# Patient Record
Sex: MALE
[De-identification: names, ages, dates, MRNs, and addresses within clinical notes are randomized per-mention and may not be internally consistent; named-entity substitution may affect disease eponyms.]

---

## 2019-12-12 ENCOUNTER — HOSPITAL ENCOUNTER (OUTPATIENT)
Dept: HOSPITAL 92 - LABBT | Age: 69
Discharge: HOME | End: 2019-12-12
Attending: ORTHOPAEDIC SURGERY
Payer: COMMERCIAL

## 2019-12-12 ENCOUNTER — HOSPITAL ENCOUNTER (INPATIENT)
Dept: HOSPITAL 92 - SJJU | Age: 69
LOS: 6 days | Discharge: HOME | DRG: 470 | End: 2019-12-18
Attending: ORTHOPAEDIC SURGERY | Admitting: ORTHOPAEDIC SURGERY
Payer: COMMERCIAL

## 2019-12-12 VITALS — BODY MASS INDEX: 28.1 KG/M2

## 2019-12-12 DIAGNOSIS — Z79.4: ICD-10-CM

## 2019-12-12 DIAGNOSIS — E78.5: ICD-10-CM

## 2019-12-12 DIAGNOSIS — Z96.652: ICD-10-CM

## 2019-12-12 DIAGNOSIS — Z01.818: Primary | ICD-10-CM

## 2019-12-12 DIAGNOSIS — N40.0: ICD-10-CM

## 2019-12-12 DIAGNOSIS — Z79.899: ICD-10-CM

## 2019-12-12 DIAGNOSIS — M17.11: ICD-10-CM

## 2019-12-12 DIAGNOSIS — E87.1: ICD-10-CM

## 2019-12-12 DIAGNOSIS — E11.9: ICD-10-CM

## 2019-12-12 DIAGNOSIS — M17.11: Primary | ICD-10-CM

## 2019-12-12 DIAGNOSIS — Z88.0: ICD-10-CM

## 2019-12-12 DIAGNOSIS — I10: ICD-10-CM

## 2019-12-12 LAB
ANION GAP SERPL CALC-SCNC: 14 MMOL/L (ref 10–20)
BASOPHILS # BLD AUTO: 0 THOU/UL (ref 0–0.2)
BASOPHILS NFR BLD AUTO: 0.6 % (ref 0–1)
BUN SERPL-MCNC: 23 MG/DL (ref 8.4–25.7)
CALCIUM SERPL-MCNC: 9.6 MG/DL (ref 7.8–10.44)
CHLORIDE SERPL-SCNC: 106 MMOL/L (ref 98–107)
CO2 SERPL-SCNC: 19 MMOL/L (ref 23–31)
CREAT CL PREDICTED SERPL C-G-VRATE: 0 ML/MIN (ref 70–130)
EOSINOPHIL # BLD AUTO: 0.2 THOU/UL (ref 0–0.7)
EOSINOPHIL NFR BLD AUTO: 2.5 % (ref 0–10)
GLUCOSE SERPL-MCNC: 171 MG/DL (ref 80–115)
HGB BLD-MCNC: 14.3 G/DL (ref 14–18)
INR PPP: 1.2
LYMPHOCYTES # BLD: 3.2 THOU/UL (ref 1.2–3.4)
LYMPHOCYTES NFR BLD AUTO: 39.7 % (ref 21–51)
MCH RBC QN AUTO: 31.1 PG (ref 27–31)
MCV RBC AUTO: 90.1 FL (ref 78–98)
MONOCYTES # BLD AUTO: 0.8 THOU/UL (ref 0.11–0.59)
MONOCYTES NFR BLD AUTO: 10.2 % (ref 0–10)
NEUTROPHILS # BLD AUTO: 3.7 THOU/UL (ref 1.4–6.5)
NEUTROPHILS NFR BLD AUTO: 47 % (ref 42–75)
PLATELET # BLD AUTO: 187 THOU/UL (ref 130–400)
POTASSIUM SERPL-SCNC: 4.2 MMOL/L (ref 3.5–5.1)
PROT UR STRIP.AUTO-MCNC: 20 MG/DL
PROTHROMBIN TIME: 15 SEC (ref 12–14.7)
RBC # BLD AUTO: 4.61 MILL/UL (ref 4.7–6.1)
RBC UR QL AUTO: (no result) HPF (ref 0–3)
SODIUM SERPL-SCNC: 135 MMOL/L (ref 136–145)
WBC # BLD AUTO: 7.9 THOU/UL (ref 4.8–10.8)
WBC UR QL AUTO: (no result) HPF (ref 0–3)

## 2019-12-12 PROCEDURE — 90662 IIV NO PRSV INCREASED AG IM: CPT

## 2019-12-12 PROCEDURE — S0020 INJECTION, BUPIVICAINE HYDRO: HCPCS

## 2019-12-12 PROCEDURE — 90471 IMMUNIZATION ADMIN: CPT

## 2019-12-12 PROCEDURE — C1776 JOINT DEVICE (IMPLANTABLE): HCPCS

## 2019-12-12 PROCEDURE — 87081 CULTURE SCREEN ONLY: CPT

## 2019-12-12 PROCEDURE — 85027 COMPLETE CBC AUTOMATED: CPT

## 2019-12-12 PROCEDURE — 36415 COLL VENOUS BLD VENIPUNCTURE: CPT

## 2019-12-12 PROCEDURE — 85610 PROTHROMBIN TIME: CPT

## 2019-12-12 PROCEDURE — 36416 COLLJ CAPILLARY BLOOD SPEC: CPT

## 2019-12-12 PROCEDURE — 80048 BASIC METABOLIC PNL TOTAL CA: CPT

## 2019-12-12 PROCEDURE — G0008 ADMIN INFLUENZA VIRUS VAC: HCPCS

## 2019-12-12 PROCEDURE — C1713 ANCHOR/SCREW BN/BN,TIS/BN: HCPCS

## 2019-12-12 PROCEDURE — 93005 ELECTROCARDIOGRAM TRACING: CPT

## 2019-12-12 PROCEDURE — 81001 URINALYSIS AUTO W/SCOPE: CPT

## 2019-12-12 PROCEDURE — 93010 ELECTROCARDIOGRAM REPORT: CPT

## 2019-12-12 PROCEDURE — 85025 COMPLETE CBC W/AUTO DIFF WBC: CPT

## 2019-12-12 PROCEDURE — 83036 HEMOGLOBIN GLYCOSYLATED A1C: CPT

## 2019-12-13 NOTE — HP
HISTORY OF PRESENT ILLNESS:  The patient is a 69-year-old male with a long history

of progressive degenerative arthritis of both knees, unresponsive to conservative

treatment including rest, restriction of activities, anti-inflammatory medication,

and previous cortisone injections.  He underwent left total knee replacement in May

of this year with good results, but continues to have progressive problems with

right knee, which is interfering with day-to-day activities including walking,

getting dressed, and sleeping. 



PAST HISTORY:  Please see the old chart.  The patient has history of hypertension,

hyperlipidemia, prostate enlargement, and diabetes. 



CURRENT MEDICATIONS:  Include; 

1. Naproxen.

2. MiraLAX.

3. Cymbalta.

4. Glipizide.

5. Lipitor.

6. Metformin.

7. Actos.

8. Avodart.

9. Flomax.

10. Hydrocodone.



ALLERGIES:  HE IS ALLERGIC TO PENICILLIN.



FAMILY HISTORY:  Otherwise, unremarkable.



SOCIAL HISTORY:  Otherwise, unremarkable.



REVIEW OF SYSTEMS:  Otherwise, unremarkable.



PHYSICAL EXAMINATION:

GENERAL:  Reveals a healthy male. 

HEENT:  Unremarkable. 

NECK:  Supple. 

CHEST:  Clear. 

HEART:  Regular rate and rhythm. 

ABDOMEN:  Soft, nontender. 

RECTAL:  Deferred. 

GENITAL:  Deferred. 

EXTREMITIES:  Pertinent findings related to the right knee, there is puffiness, but

no effusion.  There is mild varus.  There is tenderness and crepitus over the medial

joint line.  Range of motion is 5 to 100 degrees.  There is a right antalgic gait.

Pulses are 1+.  Neurovascular exam is intact.  Good capillary refill. 



DIAGNOSTIC STUDIES:  X-rays of the right knee reveal bone-on-bone collapse medially.



IMPRESSION:  

1. Degenerative arthritis, right knee.

2. Status post left total knee replacement.

3. History of hypertension.

4. History of diabetes.

5. History of benign prostatic hyperplasia.



PLAN:  Right total knee replacement.  The nature of the surgery, length of recovery,

and potential complications such as infection, loss of motion, incomplete relief,

delayed wound healing, neurovascular injury, thromboembolic phenomena, and need for

revision have been discussed in detail. 







Job ID:  [id number]

## 2019-12-16 PROCEDURE — 8E0YXBZ COMPUTER ASSISTED PROCEDURE OF LOWER EXTREMITY: ICD-10-PCS | Performed by: ORTHOPAEDIC SURGERY

## 2019-12-16 PROCEDURE — 0SRC0J9 REPLACEMENT OF RIGHT KNEE JOINT WITH SYNTHETIC SUBSTITUTE, CEMENTED, OPEN APPROACH: ICD-10-PCS | Performed by: ORTHOPAEDIC SURGERY

## 2019-12-16 RX ADMIN — HYDROCODONE BITARTRATE AND ACETAMINOPHEN PRN TAB: 10; 325 TABLET ORAL at 18:24

## 2019-12-16 RX ADMIN — INSULIN LISPRO PRN UNIT: 100 INJECTION, SOLUTION INTRAVENOUS; SUBCUTANEOUS at 18:09

## 2019-12-16 RX ADMIN — ASPIRIN SCH MG: 81 TABLET ORAL at 21:14

## 2019-12-16 NOTE — OP
DATE OF PROCEDURE:  12/16/2019



ASSISTANT:  Cecile Greene PA-C



ANESTHESIA:  General plus adductor canal and sciatic nerve blocks.



PREOPERATIVE DIAGNOSIS:  Degenerative arthritis, right knee.



POSTOPERATIVE DIAGNOSIS:  Degenerative arthritis, right knee.



PROCEDURE PERFORMED:  Right total knee replacement with computer-assisted navigation

with cemented [place] Triathlon components (#5 femoral component, #6 primary tibial

baseplate with 9 mm CS plastic insert, and A35 all-plastic patellar component). 



DESCRIPTION OF PROCEDURE:  After satisfactory anesthesia was induced in supine

position, sequential compression devices were placed on the nonoperative leg

throughout the procedure.  The right leg was then prepped and draped in routine

sterile fashion.  The right leg was elevated and exsanguinated with an Esmarch

bandage and the tourniquet was inflated to 300 mmHg.  A gently curved medial

parapatellar incision was made carried down through the subcutaneous tissues and

bleeding points were controlled with Bovie cautery.  Medial parapatellar arthrotomy

was performed.  Patella was kept laterally and portion of the fat pad were excised

for exposure.  There was marked degenerative arthritis of the knee, especially

medially with large areas of exposed bone.  Meniscal remnants and osteophytes were

removed.  Using the Proficient pinless navigation system and the appropriate guides,

the distal femoral and proximal tibial articular surfaces were excised with an

oscillating saw to accept the trial components.  It was felt that #5 femoral

component and #6 primary tibial baseplate with 9 mm CS plastic insert gave

appropriate size, fit, and stability.  The patellar articular surface was excised to

accept an all-plastic A35 patellar component.  There was good range of motion and

good patellar tracking.  The trial components were removed.  The knee was copiously

irrigated with pulsatile lavage.  The bony surfaces were thoroughly cleaned and

dried.  The permanent components were then cemented in a single stage using one

package of cement, premixed with 1 g of tobramycin powder.  Excess cement was

removed.  There was again good fit and stability of the components.  The knee was

again copiously irrigated.  The medial retinaculum and quadriceps mechanism was

closed with interrupted #2 Vicryl, running #2 Quill.  Subcutaneous tissues were

infiltrated with 30 mL of 0.25% Marcaine with epinephrine.  Subcutaneous tissues

were closed with a running 0 Quill suture.  The skin closed with running

subcuticular 3-0 Monoderm and SurgiSeal skin adhesive.  A sterile bulky compressive

dressing was applied and the tourniquet deflated after 69 minutes.  The foot

promptly pinked up.  Sequential compression devices were applied to his operated

leg.  He was awakened, taken to the recovery room in stable condition.  There were

no apparent intraoperative complications.  The estimated blood loss was less than

100 mL. 







Job ID:  [id number]

## 2019-12-16 NOTE — CON
DATE OF CONSULTATION:  



PRIMARY CARE PROVIDER:  None.



CHIEF COMPLAINT:  Management of medical comorbidities.



HISTORY OF PRESENT ILLNESS:  Mr. Boles is a pleasant 69-year-old gentleman, who

was seen at [facility] on December 16, 2019. 



He had right knee surgery earlier today.  Hospitalist Service was consulted for

management of medical comorbidities. 



Mr. Boles reports that he does not have any medical problems.  On further

questioning, he reports that he was treated in the past for diabetes mellitus,

dyslipidemia, and prostate enlargement.  His physician reportedly moved from the

area and he stopped taking all medications about a year ago. 



The patient denies any chest pain, shortness of breath, fevers, or chills.  He

denies any nausea or vomiting.  He denies any abdominal pain. 



REVIEW OF SYSTEMS:  All systems were reviewed and found to be negative.



PAST MEDICAL HISTORY:  As discussed above.



PAST SURGICAL HISTORY:  Right knee surgery today.



FAMILY HISTORY:  No family history of premature coronary artery disease.



SOCIAL HISTORY:  The patient denies tobacco use, alcohol use, or recreational drug

use. 



ALLERGIES:  PENICILLIN.



HOME MEDICATIONS:  

1. Ibuprofen p.r.n.

2. Tylenol p.r.n.



PHYSICAL EXAMINATION:

GENERAL:  On examination, Mr. Boles is awake and alert, not in acute distress. 

VITAL SIGNS:  Blood pressure is 131/68, pulse 43, respiratory rate 18, and oxygen

saturation 97% on room air.  He is afebrile. 

EYES:  No scleral icterus.  No conjunctival pallor. 

ENT:  Moist mucosal membranes.  No oropharyngeal erythema or exudates. 

NECK:  Supple, nontender.  Trachea is midline. 

RESPIRATORY:  Accessory muscles of breathing are not active.  Chest wall movements

are symmetric bilaterally.  Lungs are clear to auscultation without wheeze, rhonchi,

or crepitations. 

CARDIOVASCULAR:  S1 and S2 are heard, regular.  Peripheral pulses palpable. 

ABDOMEN:  Soft, nontender.  Bowel sounds are heard. 

NEUROLOGIC:  Cranial nerves II through XII are intact. 

MUSCULOSKELETAL:  Status post right knee surgery. 

SKIN:  No rashes. 

LYMPHATIC:  No cervical lymphadenopathy. 

PSYCHIATRIC:  Normal mood.  Normal affect.  The patient is oriented to person,

place, and time. 



DIAGNSOTIC DATA:  Mr. Boles' labs and investigations were reviewed.  Point-of-care

glucose today is 189.  On December 12th, he had normal white count, normal

hemoglobin, normal platelet count.  INR 1.2.  Mildly decreased sodium of 135, normal

potassium of 4.2, and creatinine 0.99.  Urinalysis was negative for nitrite and

leukocyte esterase. 



ASSESSMENT AND PLAN:  Mr. Boles is a pleasant 69-year-old gentleman, who was seen

at [facility] on December 16, 2019.  His problem list

includes: 

1. Diabetes mellitus:  Mr. Boles reports that he was treated in the past for

diabetes mellitus, but has stopped taking his medications approximately a year ago.

I will start him on Accu-Cheks with insulin sliding scale.  I will also check his

hemoglobin A1c.  Further management depending on the outcome of the test. 

2. Dyslipidemia:  The patient is currently not on any medications.  He will be

advised to seek primary care provider and follow up with them. 

3. Benign prostate hypertrophy:  The patient appears to be asymptomatic at this

time.  He will need to establish care with primary care provider after discharge. 

4. Hyponatremia:  Mild, likely asymptomatic. 



Many thanks for allowing me to participate in Mr. Boles' care.  Please feel free

to contact me with any questions or concerns. 



LEVEL OF RISK:  Moderate.



LEVEL OF COMPLEXITY:  Moderate.







Job ID:  [id number]

## 2019-12-16 NOTE — RAD
XR Knee Rt 2 View:



 12/16/2019 8:56 AM



CLINICAL INDICATION: Postoperative right knee



COMPARISON: None.



FINDINGS:



Bones:  No acute fracture is demonstrated. 



Joints: There is been interval placement of the right knee. Prosthesis projects in expected position.
 There is scattered intra-articular and periarticular soft tissue gas consistent with the patient's

recent postoperative state.. 



Soft Tissue: No acute abnormality..

  

IMPRESSION: 

Right total knee replacement. 



Reported By: Jatinder Huffman 

Electronically Signed:  12/16/2019 9:13 AM

## 2019-12-17 LAB
HGB BLD-MCNC: 11 G/DL (ref 14–18)
MCH RBC QN AUTO: 30.9 PG (ref 27–31)
MCV RBC AUTO: 89.3 FL (ref 78–98)
PLATELET # BLD AUTO: 142 THOU/UL (ref 130–400)
RBC # BLD AUTO: 3.57 MILL/UL (ref 4.7–6.1)
WBC # BLD AUTO: 8.2 THOU/UL (ref 4.8–10.8)

## 2019-12-17 RX ADMIN — ASPIRIN SCH MG: 81 TABLET ORAL at 07:53

## 2019-12-17 RX ADMIN — ASPIRIN SCH MG: 81 TABLET ORAL at 21:31

## 2019-12-17 RX ADMIN — HYDROCODONE BITARTRATE AND ACETAMINOPHEN PRN TAB: 10; 325 TABLET ORAL at 09:04

## 2019-12-17 RX ADMIN — Medication PRN ML: at 11:45

## 2019-12-17 RX ADMIN — HYDROCODONE BITARTRATE AND ACETAMINOPHEN PRN TAB: 10; 325 TABLET ORAL at 21:38

## 2019-12-17 RX ADMIN — HYDROCODONE BITARTRATE AND ACETAMINOPHEN PRN TAB: 10; 325 TABLET ORAL at 04:03

## 2019-12-17 RX ADMIN — DOCUSATE SODIUM 50 MG AND SENNOSIDES 8.6 MG SCH TAB: 8.6; 5 TABLET, FILM COATED ORAL at 07:53

## 2019-12-17 RX ADMIN — INSULIN LISPRO PRN UNIT: 100 INJECTION, SOLUTION INTRAVENOUS; SUBCUTANEOUS at 21:31

## 2019-12-17 RX ADMIN — INSULIN LISPRO PRN UNIT: 100 INJECTION, SOLUTION INTRAVENOUS; SUBCUTANEOUS at 11:44

## 2019-12-17 RX ADMIN — DOCUSATE SODIUM 50 MG AND SENNOSIDES 8.6 MG SCH TAB: 8.6; 5 TABLET, FILM COATED ORAL at 21:31

## 2019-12-17 RX ADMIN — MULTIPLE VITAMINS W/ MINERALS TAB SCH TAB: TAB at 07:53

## 2019-12-17 RX ADMIN — HYDROCODONE BITARTRATE AND ACETAMINOPHEN PRN TAB: 10; 325 TABLET ORAL at 16:05

## 2019-12-17 RX ADMIN — Medication PRN ML: at 18:11

## 2019-12-17 NOTE — PDOC.HOSPP
- Subjective


Encounter Date: 12/17/19


Encounter Time: 09:00


Subjective: 





Pt seen for followup re: DM2.  Feels well, no complaints.





- Objective


Vital Signs & Weight: 


 Vital Signs (12 hours)











  Temp Pulse Resp BP Pulse Ox


 


 12/17/19 15:35  98.3 F  60  18  147/80 H  94 L


 


 12/17/19 11:04  97.1 F L  61  20  117/68  94 L


 


 12/17/19 08:00      94 L


 


 12/17/19 07:48  98.1 F  78  16  127/76  94 L








 Weight











Admit Weight                   180 lb


 


Weight                         180 lb














I&O: 


 











 12/16/19 12/17/19 12/18/19





 06:59 06:59 06:59


 


Intake Total  3005 


 


Output Total  1475 


 


Balance  1530 











Result Diagrams: 


 12/17/19 04:49





Additional Labs: 


 Accuchecks











  12/17/19 12/17/19 12/17/19





  16:00 10:48 05:16


 


POC Glucose  140 H  217 H  149 H














  12/16/19 12/16/19





  20:40 18:04


 


POC Glucose  178 H  283 H








Labs and MARs reviewed by me





Hospitalist ROS





- Review of Systems


Cardiovascular: denies: chest pain, palpitations, orthopnea, paroxysmal noc. 

dyspnea, edema, light headedness


Gastrointestinal: denies: nausea, vomiting, abdominal pain, diarrhea, 

constipation, melena, hematochezia





- Medication


Medications: 


Active Medications











Generic Name Dose Route Start Last Admin





  Trade Name Freq  PRN Reason Stop Dose Admin


 


Hydrocodone Bitart/Acetaminophen  1 tab  12/16/19 07:13  12/17/19 04:03





  [place] 10/325  PO   1 tab





  Q4H PRN   Administration





  Pain (1-3)   





     





     





     


 


Hydrocodone Bitart/Acetaminophen  2 tab  12/16/19 07:13  12/17/19 16:05





  [place] 10/325  PO   2 tab





  Q4H PRN   Administration





  PAIN (4-6)   





     





     





     


 


Aspirin  81 mg  12/16/19 21:00  12/17/19 07:53





  Ecotrin  PO   81 mg





  BID KAY   Administration





     





     





     





     


 


Ropivacaine 250 ml/ Device  250 mls @ 10 mls/hr  12/16/19 07:13  12/17/19 12:07





  NERVE BLCK  12/19/19 07:12  250 mls





  INF KAY   Administration





     





     





     





     


 


Sodium Chloride  1,000 mls @ 100 mls/hr  12/16/19 10:36  12/17/19 05:53





  Normal Saline 0.9%  IV   Not Given





  .Q10H KAY   





     





     





     





     


 


Insulin Human Lispro  0 units  12/16/19 17:53  12/17/19 11:44





  Humalog  SC   3 unit





  .MILD SLIDING SCALE PRN   Administration





  Mild Correctional Scale   





     





     





     


 


Iron/Minerals/Multivitamins  1 tab  12/17/19 09:00  12/17/19 07:53





  Theragran M  PO   1 tab





  DAILY KAY   Administration





     





     





     





     


 


Ketorolac Tromethamine  15 mg  12/16/19 12:00  12/17/19 11:42





  Toradol  IVP  12/18/19 06:01  15 mg





  Q6HR KAY   Administration





     





     





     





     


 


Ketorolac Tromethamine  15 mg  12/16/19 12:00  12/17/19 11:43





  Toradol  IVP  12/18/19 12:01  Not Given





  Q6HR KAY   





     





     





     





     


 


Senna/Docusate Sodium  2 tab  12/17/19 09:00  12/17/19 07:53





  Senokot S  PO   2 tab





  BID KAY   Administration





     





     





     





     


 


Sodium Chloride  10 ml  12/16/19 10:36  12/17/19 11:45





  Flush - Normal Saline  IVF   10 ml





  PRN PRN   Administration





  Saline Flush   





     





     





     


 


Tramadol HCl  50 mg  12/16/19 07:13  12/16/19 21:18





  Ultram  PO   50 mg





  Q6H PRN   Administration





  Mild Pain (1-3)   





     





     





     


 


Tramadol HCl  100 mg  12/16/19 07:13  12/17/19 06:21





  Ultram  PO   100 mg





  Q6H PRN   Administration





  Moderate Pain 4-6   





     





     





     














- Exam


General Appearance: NAD


Eye: anicteric sclera


ENT: moist mucosa


Neck: supple


Heart: murmur present


Respiratory: CTAB


Gastrointestinal: soft, non-tender


Skin: no lesions


Neurological: no weakness


Psychiatric: normal affect, normal behavior





Hosp A/P


(1) DM2 (diabetes mellitus, type 2)


Status: Chronic   





(2) HTN (hypertension)


Code(s): I10 - ESSENTIAL (PRIMARY) HYPERTENSION   Status: Chronic   





- Plan





Continue accuchecks, insulin sliding scale.


Monitor vital signs, titrate antihypertensives as needed.


Pt educated on the importance of medication compliance.

## 2019-12-17 NOTE — PRG
DATE OF SERVICE:  12/17/2019



SUBJECTIVE:  Julian is a 69-year-old male postop day 1 from right total knee

arthroplasty.  He is doing relatively well.  He had a little bit of discomfort last

night, but otherwise he ambulated 120 feet yesterday. 



OBJECTIVE:  VITAL SIGNS:  Temperature 98.9, pulse 68, respiratory rate 16 and

nonlabored, blood pressure 124/71. 

GENERAL:  He is alert and oriented to person, place, time, situation, responsive,

appropriate with examiner.  EXTREMITIES:  His incision is clean.  No strike through.

 He is neurovascularly intact in the right lower extremity. 



LABORATORY DATA:  Hemoglobin 11, hematocrit 31.9.



IMPRESSION:  A 69-year-old male postop day 1 right total knee arthroplasty, doing

well. 



PLAN:  Initiate physical therapy and probable discharge to home tomorrow.







Job ID:  [id number]

## 2019-12-18 VITALS — TEMPERATURE: 97.1 F | SYSTOLIC BLOOD PRESSURE: 131 MMHG | DIASTOLIC BLOOD PRESSURE: 70 MMHG

## 2019-12-18 RX ADMIN — MULTIPLE VITAMINS W/ MINERALS TAB SCH TAB: TAB at 08:08

## 2019-12-18 RX ADMIN — DOCUSATE SODIUM 50 MG AND SENNOSIDES 8.6 MG SCH TAB: 8.6; 5 TABLET, FILM COATED ORAL at 08:08

## 2019-12-18 RX ADMIN — ASPIRIN SCH MG: 81 TABLET ORAL at 08:08

## 2019-12-18 RX ADMIN — INSULIN LISPRO PRN UNIT: 100 INJECTION, SOLUTION INTRAVENOUS; SUBCUTANEOUS at 06:17

## 2019-12-18 RX ADMIN — HYDROCODONE BITARTRATE AND ACETAMINOPHEN PRN TAB: 10; 325 TABLET ORAL at 06:16

## 2019-12-18 RX ADMIN — HYDROCODONE BITARTRATE AND ACETAMINOPHEN PRN TAB: 10; 325 TABLET ORAL at 09:52

## 2019-12-18 NOTE — DIS
DATE OF ADMISSION:  12/16/2019



DATE OF DISCHARGE:  12/18/2019



This is Cecile Greene PA-C dictating a report for Vasiliy Chi MD.



CONSULTANTS:  Include [facility] and [facility]



PREOPERATIVE DIAGNOSIS:  Degenerative arthritis, right knee.



POSTOPERATIVE DIAGNOSIS:  Degenerative arthritis, right knee.



PROCEDURES PERFORMED:  Right total knee replacement with computer-assisted

navigation system with cemented [place] Triathlon components. 



BRIEF HOSPITAL COURSE:  This is a 69-year-old male, who failed outpatient

conservative management of right knee osteoarthritis.  He was indicated for the

above-mentioned procedure.  He did well in the operative suite and was then admitted

postoperatively to [address], where he worked with

physical and occupational therapist.  His pain was managed by [facility].  He worked well with Physical and Occupational therapy, and on

postoperative day #2, he was in good and stable condition, was discharged home with

family.  No complications incurred during this hospital stay. 



DISCHARGE DISPOSITION:  Home.



DISCHARGE CONDITION:  Stable.



DISCHARGE INSTRUCTIONS:  The patient will follow up with Dr. Chi as scheduled.  He

will also follow up with Physical Therapy as scheduled.  He will keep his surgical

site clean, dry, and intact until his followup. 







Job ID:  [id number]

## 2019-12-20 ENCOUNTER — HOSPITAL ENCOUNTER (EMERGENCY)
Dept: HOSPITAL 92 - ERS | Age: 69
Discharge: HOME | End: 2019-12-20
Payer: COMMERCIAL

## 2019-12-20 DIAGNOSIS — M25.561: Primary | ICD-10-CM

## 2019-12-20 DIAGNOSIS — G89.18: ICD-10-CM

## 2019-12-20 LAB
ALBUMIN SERPL BCG-MCNC: 3.6 G/DL (ref 3.4–4.8)
ALP SERPL-CCNC: 231 U/L (ref 40–110)
ALT SERPL W P-5'-P-CCNC: 109 U/L (ref 8–55)
ANION GAP SERPL CALC-SCNC: 11 MMOL/L (ref 10–20)
AST SERPL-CCNC: 96 U/L (ref 5–34)
BASOPHILS # BLD AUTO: 0 THOU/UL (ref 0–0.2)
BASOPHILS NFR BLD AUTO: 0.4 % (ref 0–1)
BILIRUB SERPL-MCNC: 0.8 MG/DL (ref 0.2–1.2)
BUN SERPL-MCNC: 19 MG/DL (ref 8.4–25.7)
CALCIUM SERPL-MCNC: 8.7 MG/DL (ref 7.8–10.44)
CHLORIDE SERPL-SCNC: 99 MMOL/L (ref 98–107)
CO2 SERPL-SCNC: 25 MMOL/L (ref 23–31)
CREAT CL PREDICTED SERPL C-G-VRATE: 0 ML/MIN (ref 70–130)
EOSINOPHIL # BLD AUTO: 0.2 THOU/UL (ref 0–0.7)
EOSINOPHIL NFR BLD AUTO: 2.7 % (ref 0–10)
GLOBULIN SER CALC-MCNC: 3.2 G/DL (ref 2.4–3.5)
GLUCOSE SERPL-MCNC: 215 MG/DL (ref 80–115)
HGB BLD-MCNC: 11.2 G/DL (ref 14–18)
LYMPHOCYTES # BLD: 1.9 THOU/UL (ref 1.2–3.4)
LYMPHOCYTES NFR BLD AUTO: 26.2 % (ref 21–51)
MCH RBC QN AUTO: 30.6 PG (ref 27–31)
MCV RBC AUTO: 90.1 FL (ref 78–98)
MONOCYTES # BLD AUTO: 0.9 THOU/UL (ref 0.11–0.59)
MONOCYTES NFR BLD AUTO: 11.9 % (ref 0–10)
NEUTROPHILS # BLD AUTO: 4.2 THOU/UL (ref 1.4–6.5)
NEUTROPHILS NFR BLD AUTO: 58.8 % (ref 42–75)
PLATELET # BLD AUTO: 216 THOU/UL (ref 130–400)
POTASSIUM SERPL-SCNC: 4.9 MMOL/L (ref 3.5–5.1)
RBC # BLD AUTO: 3.65 MILL/UL (ref 4.7–6.1)
SODIUM SERPL-SCNC: 130 MMOL/L (ref 136–145)
WBC # BLD AUTO: 7.1 THOU/UL (ref 4.8–10.8)

## 2019-12-20 PROCEDURE — 96361 HYDRATE IV INFUSION ADD-ON: CPT

## 2019-12-20 PROCEDURE — 96375 TX/PRO/DX INJ NEW DRUG ADDON: CPT

## 2019-12-20 PROCEDURE — 86140 C-REACTIVE PROTEIN: CPT

## 2019-12-20 PROCEDURE — 87040 BLOOD CULTURE FOR BACTERIA: CPT

## 2019-12-20 PROCEDURE — 85652 RBC SED RATE AUTOMATED: CPT

## 2019-12-20 PROCEDURE — 36415 COLL VENOUS BLD VENIPUNCTURE: CPT

## 2019-12-20 PROCEDURE — 85025 COMPLETE CBC W/AUTO DIFF WBC: CPT

## 2019-12-20 PROCEDURE — 83605 ASSAY OF LACTIC ACID: CPT

## 2019-12-20 PROCEDURE — 80053 COMPREHEN METABOLIC PANEL: CPT

## 2019-12-20 PROCEDURE — 96374 THER/PROPH/DIAG INJ IV PUSH: CPT

## 2019-12-20 NOTE — ULT
RIGHT LOWER EXTREMITY DOPPLER VENOUS ULTRASOUND



PROVIDED CLINICAL HISTORY:

Right knee surgery 4 days ago with increased edema and pain in the right lower extremity



TECHNIQUE:

Grayscale and color Doppler sonography with spectral analysis was performed of the right common femor
al, femoral, popliteal, posterior tibial, greater saphenous and profunda femoral veins.



FINDINGS: There is normal compression, flow and augmentation seen within the deep venous structures o
f the right lower extremity.



IMPRESSION:

No sonographic evidence for right lower extremity deep venous thrombosis.



Reported By: Jatinder Huffman 

Electronically Signed:  12/20/2019 3:46 PM

## 2019-12-20 NOTE — RAD
XR Knee Rt 2 View



History: Swollen painful knee



Comparison: Radiograph December 16, 2019



Findings: Partial resorption of the subcutaneous emphysema. Large joint effusion. No displacement of 
hardware.



Impression: Enlarging joint effusion with resorption of subcutaneous emphysema may reflect a hematoma
.



Reported By: Porfirio Pennington 

Electronically Signed:  12/20/2019 4:17 PM

## 2021-06-21 ENCOUNTER — HOSPITAL ENCOUNTER (EMERGENCY)
Dept: HOSPITAL 92 - ERS | Age: 71
Discharge: HOME | End: 2021-06-21
Payer: COMMERCIAL
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
## 2021-06-21 DIAGNOSIS — E78.5: ICD-10-CM

## 2021-06-21 DIAGNOSIS — S72.435A: Primary | ICD-10-CM

## 2021-06-21 DIAGNOSIS — E11.9: ICD-10-CM

## 2021-06-21 DIAGNOSIS — E78.00: ICD-10-CM

## 2021-06-21 DIAGNOSIS — I10: ICD-10-CM
